# Patient Record
Sex: FEMALE | Race: WHITE | NOT HISPANIC OR LATINO | Employment: PART TIME | URBAN - METROPOLITAN AREA
[De-identification: names, ages, dates, MRNs, and addresses within clinical notes are randomized per-mention and may not be internally consistent; named-entity substitution may affect disease eponyms.]

---
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
## 2025-01-07 DIAGNOSIS — Z34.02 ENCOUNTER FOR SUPERVISION OF NORMAL FIRST PREGNANCY IN SECOND TRIMESTER: Primary | ICD-10-CM

## 2025-01-13 ENCOUNTER — PROCEDURE VISIT (OUTPATIENT)
Dept: OBSTETRICS AND GYNECOLOGY | Facility: CLINIC | Age: 20
End: 2025-01-13
Payer: COMMERCIAL

## 2025-01-13 ENCOUNTER — ROUTINE PRENATAL (OUTPATIENT)
Dept: OBSTETRICS AND GYNECOLOGY | Facility: CLINIC | Age: 20
End: 2025-01-13
Payer: COMMERCIAL

## 2025-01-13 VITALS
HEART RATE: 101 BPM | SYSTOLIC BLOOD PRESSURE: 109 MMHG | DIASTOLIC BLOOD PRESSURE: 68 MMHG | WEIGHT: 172.69 LBS | BODY MASS INDEX: 27.87 KG/M2

## 2025-01-13 DIAGNOSIS — Z34.02 ENCOUNTER FOR SUPERVISION OF NORMAL FIRST PREGNANCY IN SECOND TRIMESTER: Primary | ICD-10-CM

## 2025-01-13 DIAGNOSIS — Z34.02 ENCOUNTER FOR SUPERVISION OF NORMAL FIRST PREGNANCY IN SECOND TRIMESTER: ICD-10-CM

## 2025-01-13 DIAGNOSIS — Z3A.22 22 WEEKS GESTATION OF PREGNANCY: ICD-10-CM

## 2025-01-13 PROCEDURE — 0502F SUBSEQUENT PRENATAL CARE: CPT | Mod: CPTII,,, | Performed by: STUDENT IN AN ORGANIZED HEALTH CARE EDUCATION/TRAINING PROGRAM

## 2025-01-13 NOTE — PROGRESS NOTES
CC: Follow-Up OB    HPI:   19 y.o.  at 22w3d with EDC of 2025, by Ultrasound, here for her follow up OB visit. Complains of nothing today.    Pregnancy ROS:  Positive fetal movement   Negative leakage of fluid   Negative vaginal bleeding   Negative headache, vision changes, RUQ pain, epigastric pain  Negative contractions/abdominal pain   Negative pelvic pressure     Physical Exam:  Prenatal Vitals  BP: 109/68  Weight: 78.3 kg (172 lb 11.2 oz)  Urine Glucose: Negative  Urine Albumin: Negative    Wt Readings from Last 3 Encounters:   25 78.3 kg (172 lb 11.2 oz) (93%, Z= 1.47)*   24 73.9 kg (163 lb) (89%, Z= 1.25)*   24 72.1 kg (159 lb) (87%, Z= 1.15)*     * Growth percentiles are based on [facility] (Girls, 2-20 Years) data.     Body mass index is 27.87 kg/m².    General: NAD, well developed, well nourished  Psych: alert and oriented to person, time and place, normal affect  HEENT: normocephalic, atraumatic  Abd: Gravid, soft, NT, ND  Skin: warm, dry  Neuro: normal gait, gross motor function intact  No cyanosis, clubbing or edema    FHTs: +    Maternal Blood Type: A+/-    ASSESSMENT: 19 y.o.  @ 22w3d with   Patient Active Problem List   Diagnosis    Encounter for supervision of normal pregnancy in first trimester       PLAN:  Anatomy scan  Osull on RTC  Pain, fever, bleeding precautions  MARINA, JUAN, PreE precautions  Cont PNV, Iron  Return to clinic in 3 weeks

## 2025-01-24 ENCOUNTER — PATIENT MESSAGE (OUTPATIENT)
Dept: OTHER | Facility: OTHER | Age: 20
End: 2025-01-24
Payer: COMMERCIAL

## 2025-02-07 ENCOUNTER — PATIENT MESSAGE (OUTPATIENT)
Dept: OTHER | Facility: OTHER | Age: 20
End: 2025-02-07
Payer: COMMERCIAL

## 2025-02-10 ENCOUNTER — ROUTINE PRENATAL (OUTPATIENT)
Dept: OBSTETRICS AND GYNECOLOGY | Facility: CLINIC | Age: 20
End: 2025-02-10
Payer: COMMERCIAL

## 2025-02-10 ENCOUNTER — TELEPHONE (OUTPATIENT)
Dept: OBSTETRICS AND GYNECOLOGY | Facility: CLINIC | Age: 20
End: 2025-02-10

## 2025-02-10 ENCOUNTER — PATIENT MESSAGE (OUTPATIENT)
Dept: OBSTETRICS AND GYNECOLOGY | Facility: CLINIC | Age: 20
End: 2025-02-10

## 2025-02-10 VITALS
BODY MASS INDEX: 28.41 KG/M2 | DIASTOLIC BLOOD PRESSURE: 68 MMHG | WEIGHT: 176 LBS | HEART RATE: 90 BPM | SYSTOLIC BLOOD PRESSURE: 112 MMHG

## 2025-02-10 DIAGNOSIS — Z34.02 ENCOUNTER FOR SUPERVISION OF NORMAL FIRST PREGNANCY IN SECOND TRIMESTER: Primary | ICD-10-CM

## 2025-02-10 DIAGNOSIS — Z3A.26 26 WEEKS GESTATION OF PREGNANCY: ICD-10-CM

## 2025-02-10 PROBLEM — O99.810 ABNORMAL O'SULLIVAN GLUCOSE CHALLENGE TEST, ANTEPARTUM: Status: ACTIVE | Noted: 2025-02-10

## 2025-02-10 LAB — GLUCOSE 1 HR POST 50 GM: 143 MG/DL (ref 70–130)

## 2025-02-10 PROCEDURE — 0502F SUBSEQUENT PRENATAL CARE: CPT | Mod: CPTII,,, | Performed by: STUDENT IN AN ORGANIZED HEALTH CARE EDUCATION/TRAINING PROGRAM

## 2025-02-10 NOTE — PROGRESS NOTES
CC: Follow-Up OB    HPI:   19 y.o.  at 26w3d with EDC of 2025, by Ultrasound, here for her follow up OB visit. Complains of nothing today.    Pregnancy ROS:  Positive fetal movement   Negative leakage of fluid   Negative vaginal bleeding   Negative headache, vision changes, RUQ pain, epigastric pain  Negative contractions/abdominal pain   Negative pelvic pressure     Physical Exam:  Prenatal Vitals  BP: 112/68  Weight: 79.8 kg (176 lb)    Wt Readings from Last 3 Encounters:   02/10/25 79.8 kg (176 lb) (94%, Z= 1.53)*   25 78.3 kg (172 lb 11.2 oz) (93%, Z= 1.47)*   24 73.9 kg (163 lb) (89%, Z= 1.25)*     * Growth percentiles are based on [facility] (Girls, 2-20 Years) data.       Body mass index is 28.41 kg/m².    General: NAD, well developed, well nourished  Psych: alert and oriented to person, time and place, normal affect  HEENT: normocephalic, atraumatic  Abd: Gravid, soft, NT, ND  Skin: warm, dry  Neuro: normal gait, gross motor function intact  No cyanosis, clubbing or edema    FHTs: +    Maternal Blood Type: A+/-    ASSESSMENT: 19 y.o.  @ 26w3d with   Patient Active Problem List   Diagnosis    Encounter for supervision of normal pregnancy in first trimester       PLAN:  Osull today  3rd trimester labs and tdap on RTC  Pain, fever, bleeding precautions  MARINA, JUAN, PreE precautions  Cont PNV, Iron  Return to clinic in 3 weeks

## 2025-02-10 NOTE — TELEPHONE ENCOUNTER
----- Message from Dandy sent at 2/10/2025  2:23 PM CST -----  Contact: self:[phone]  Type:  Patient Returning Call    Who Called:Josefina  Who Left Message for Patient: Manuela  Does the patient know what this is regarding?:yes  Would the patient rather a call back or a response via [facility]? Call back   Best Call Back Number:[phone]   Additional Information: pt can't get into [facility] , would chica a phone call

## 2025-02-21 ENCOUNTER — PATIENT MESSAGE (OUTPATIENT)
Dept: OTHER | Facility: OTHER | Age: 20
End: 2025-02-21
Payer: COMMERCIAL

## 2025-03-03 ENCOUNTER — ROUTINE PRENATAL (OUTPATIENT)
Dept: OBSTETRICS AND GYNECOLOGY | Facility: CLINIC | Age: 20
End: 2025-03-03
Payer: COMMERCIAL

## 2025-03-03 VITALS
SYSTOLIC BLOOD PRESSURE: 110 MMHG | WEIGHT: 179 LBS | HEART RATE: 101 BPM | DIASTOLIC BLOOD PRESSURE: 64 MMHG | BODY MASS INDEX: 28.89 KG/M2

## 2025-03-03 DIAGNOSIS — Z3A.29 29 WEEKS GESTATION OF PREGNANCY: ICD-10-CM

## 2025-03-03 DIAGNOSIS — Z34.03 ENCOUNTER FOR SUPERVISION OF NORMAL FIRST PREGNANCY IN THIRD TRIMESTER: Primary | ICD-10-CM

## 2025-03-03 PROCEDURE — 0502F SUBSEQUENT PRENATAL CARE: CPT | Mod: CPTII,,, | Performed by: STUDENT IN AN ORGANIZED HEALTH CARE EDUCATION/TRAINING PROGRAM

## 2025-03-03 NOTE — PROGRESS NOTES
CC: Follow-Up OB    HPI:   19 y.o.  at 29w3d with EDC of 2025, by Ultrasound, here for her follow up OB visit. Complains of nothing today.    Pregnancy ROS:  Positive fetal movement   Negative leakage of fluid   Negative vaginal bleeding   Negative headache, vision changes, RUQ pain, epigastric pain  Negative contractions/abdominal pain   Negative pelvic pressure     Physical Exam:  Prenatal Vitals  BP: 110/64  Weight: 81.2 kg (179 lb)    Wt Readings from Last 3 Encounters:   25 81.2 kg (179 lb) (94%, Z= 1.59)*   02/10/25 79.8 kg (176 lb) (94%, Z= 1.53)*   25 78.3 kg (172 lb 11.2 oz) (93%, Z= 1.47)*     * Growth percentiles are based on [facility] (Girls, 2-20 Years) data.     Body mass index is 28.89 kg/m².    General: NAD, well developed, well nourished  Psych: alert and oriented to person, time and place, normal affect  HEENT: normocephalic, atraumatic  Abd: Gravid, soft, NT, ND  Skin: warm, dry  Neuro: normal gait, gross motor function intact  No cyanosis, clubbing or edema    FHTs: +    Maternal Blood Type: A+/-    ASSESSMENT: 19 y.o.  @ 29w3d with   Problem List[1]    PLAN:  Osull abn, 3h GTT wnl  3rd trimester labs and tdap today  Pain, fever, bleeding precautions  MARINA, JUAN, PreE precautions  Cont PNV, Iron  Return to clinic in 3 weeks           [1]   Patient Active Problem List  Diagnosis    Encounter for supervision of normal pregnancy in first trimester    Abnormal O'Gonzalez glucose challenge test, antepartum

## 2025-03-07 ENCOUNTER — PATIENT MESSAGE (OUTPATIENT)
Dept: OTHER | Facility: OTHER | Age: 20
End: 2025-03-07
Payer: COMMERCIAL

## 2025-03-17 ENCOUNTER — ROUTINE PRENATAL (OUTPATIENT)
Dept: OBSTETRICS AND GYNECOLOGY | Facility: CLINIC | Age: 20
End: 2025-03-17
Payer: COMMERCIAL

## 2025-03-17 VITALS — BODY MASS INDEX: 28.73 KG/M2 | WEIGHT: 178 LBS | DIASTOLIC BLOOD PRESSURE: 68 MMHG | SYSTOLIC BLOOD PRESSURE: 108 MMHG

## 2025-03-17 DIAGNOSIS — Z3A.31 31 WEEKS GESTATION OF PREGNANCY: ICD-10-CM

## 2025-03-17 DIAGNOSIS — Z34.03 ENCOUNTER FOR SUPERVISION OF NORMAL FIRST PREGNANCY IN THIRD TRIMESTER: Primary | ICD-10-CM

## 2025-03-17 LAB
ABS NRBC COUNT: 0 X 10 3/UL (ref 0–0.01)
ABSOLUTE BASOPHIL: 0.02 X 10 3/UL (ref 0–0.22)
ABSOLUTE EOSINOPHIL: 0.11 X 10 3/UL (ref 0.04–0.54)
ABSOLUTE IMMATURE GRAN: 0.03 X 10 3/UL (ref 0–0.04)
ABSOLUTE LYMPHOCYTE: 1.79 X 10 3/UL (ref 0.86–4.75)
ABSOLUTE MONOCYTE: 0.59 X 10 3/UL (ref 0.22–1.08)
BASOPHILS NFR BLD: 0.2 % (ref 0.2–1.2)
EOSINOPHIL NFR BLD: 1.2 % (ref 0.7–7)
HCT VFR BLD AUTO: 30.9 % (ref 37–47)
HGB BLD-MCNC: 10.2 G/DL (ref 12–16)
HIV 1+2 AB+HIV1 P24 AG SERPL QL IA: NONREACTIVE
IMMATURE GRANULOCYTES: 0.3 % (ref 0–0.5)
LYMPHOCYTES NFR BLD: 18.9 % (ref 19.3–53.1)
MCH RBC QN AUTO: 29 PG (ref 27–32)
MCHC RBC AUTO-ENTMCNC: 33 G/DL (ref 32–36)
MCV RBC AUTO: 87.8 FL (ref 82–100)
MONOCYTES NFR BLD: 6.2 % (ref 4.7–12.5)
NEUTROPHILS # BLD AUTO: 6.93 X 10 3/UL (ref 2.15–7.56)
NEUTROPHILS NFR BLD: 73.2 % (ref 34–71.1)
NUCLEATED RED BLOOD CELLS: 0 /100 WBC (ref 0–0.2)
PLATELET # BLD AUTO: 235 X 10 3/UL (ref 135–400)
RBC # BLD AUTO: 3.52 X 10 6/UL (ref 4.2–5.4)
RDW-SD: 36.7 FL (ref 37–54)
SYPHILIS TREPONEMAL ANTIBODY: NONREACTIVE
WBC # BLD: 9.47 X 10 3/UL (ref 4.3–10.8)

## 2025-03-17 PROCEDURE — 0502F SUBSEQUENT PRENATAL CARE: CPT | Mod: CPTII,,, | Performed by: STUDENT IN AN ORGANIZED HEALTH CARE EDUCATION/TRAINING PROGRAM

## 2025-03-17 NOTE — PROGRESS NOTES
CC: Follow-Up OB    HPI:   19 y.o.  at 31w3d with EDC of 2025, by Ultrasound, here for her follow up OB visit. Complains of nothing today.    Pregnancy ROS:  Positive fetal movement   Negative leakage of fluid   Negative vaginal bleeding   Negative headache, vision changes, RUQ pain, epigastric pain  Negative contractions/abdominal pain   Negative pelvic pressure     Physical Exam:  Prenatal Vitals  BP: 108/68  Weight: 80.7 kg (178 lb)    Wt Readings from Last 3 Encounters:   25 80.7 kg (178 lb) (94%, Z= 1.57)*   25 81.2 kg (179 lb) (94%, Z= 1.59)*   02/10/25 79.8 kg (176 lb) (94%, Z= 1.53)*     * Growth percentiles are based on CDC (Girls, 2-20 Years) data.     Body mass index is 28.73 kg/m².    General: NAD, well developed, well nourished  Psych: alert and oriented to person, time and place, normal affect  HEENT: normocephalic, atraumatic  Abd: Gravid, soft, NT, ND  Skin: warm, dry  Neuro: normal gait, gross motor function intact  No cyanosis, clubbing or edema    FHTs: +    Maternal Blood Type: A+/-    ASSESSMENT: 19 y.o.  @ 31w3d with   Problem List[1]    PLAN:  3rd trimester labs today   Pain, fever, bleeding precautions  MARINA, JUAN, PreE precautions  Cont PNV, Iron  Return to clinic in 2 weeks           [1]   Patient Active Problem List  Diagnosis    Encounter for supervision of normal pregnancy in first trimester    Abnormal O'Gonzalez glucose challenge test, antepartum

## 2025-03-21 ENCOUNTER — PATIENT MESSAGE (OUTPATIENT)
Dept: OTHER | Facility: OTHER | Age: 20
End: 2025-03-21
Payer: COMMERCIAL

## 2025-03-31 ENCOUNTER — ROUTINE PRENATAL (OUTPATIENT)
Dept: OBSTETRICS AND GYNECOLOGY | Facility: CLINIC | Age: 20
End: 2025-03-31
Payer: COMMERCIAL

## 2025-03-31 VITALS
SYSTOLIC BLOOD PRESSURE: 102 MMHG | BODY MASS INDEX: 29.57 KG/M2 | HEART RATE: 112 BPM | WEIGHT: 183.19 LBS | DIASTOLIC BLOOD PRESSURE: 68 MMHG

## 2025-03-31 DIAGNOSIS — Z3A.33 33 WEEKS GESTATION OF PREGNANCY: ICD-10-CM

## 2025-03-31 DIAGNOSIS — Z34.03 ENCOUNTER FOR SUPERVISION OF NORMAL FIRST PREGNANCY IN THIRD TRIMESTER: Primary | ICD-10-CM

## 2025-03-31 PROCEDURE — 0502F SUBSEQUENT PRENATAL CARE: CPT | Mod: CPTII,,, | Performed by: STUDENT IN AN ORGANIZED HEALTH CARE EDUCATION/TRAINING PROGRAM

## 2025-03-31 NOTE — PROGRESS NOTES
CC: Follow-Up OB    HPI:   19 y.o.  at 33w3d with EDC of 2025, by Ultrasound, here for her follow up OB visit. Complains of nothing today.    Pregnancy ROS:  Positive fetal movement   Negative leakage of fluid   Negative vaginal bleeding   Negative headache, vision changes, RUQ pain, epigastric pain  Negative contractions/abdominal pain   Negative pelvic pressure     Physical Exam:  Prenatal Vitals  BP: 102/68  Weight: 83.1 kg (183 lb 3.2 oz)    Wt Readings from Last 3 Encounters:   25 83.1 kg (183 lb 3.2 oz) (95%, Z= 1.66)*   25 80.7 kg (178 lb) (94%, Z= 1.57)*   25 81.2 kg (179 lb) (94%, Z= 1.59)*     * Growth percentiles are based on [facility] (Girls, 2-20 Years) data.       Body mass index is 29.57 kg/m².    General: NAD, well developed, well nourished  Psych: alert and oriented to person, time and place, normal affect  HEENT: normocephalic, atraumatic  Abd: Gravid, soft, NT, ND  Skin: warm, dry  Neuro: normal gait, gross motor function intact  No cyanosis, clubbing or edema    FHTs: +    Maternal Blood Type: A+/-    ASSESSMENT: 19 y.o.  @ 33w3d with   Problem List[1]    PLAN:  3rd trimester labs reviewed  GBS and discuss IOL on RTC   Pain, fever, bleeding precautions  MARINA, JUAN, PreE precautions  Cont PNV, Iron  Return to clinic in 2 weeks           [1]   Patient Active Problem List  Diagnosis    Encounter for supervision of normal pregnancy in first trimester    Abnormal O'Gonzalez glucose challenge test, antepartum

## 2025-04-08 ENCOUNTER — TELEPHONE (OUTPATIENT)
Dept: OBSTETRICS AND GYNECOLOGY | Facility: CLINIC | Age: 20
End: 2025-04-08
Payer: COMMERCIAL

## 2025-04-08 NOTE — TELEPHONE ENCOUNTER
Spoke to pt she stated she has been vomiting and diarrhea and also hasn't felt baby move much. Told her to go to L&D.

## 2025-04-08 NOTE — TELEPHONE ENCOUNTER
----- Message from Maricel sent at 4/8/2025  1:13 PM CDT -----  Contact: can  .Type:  Needs Medical AdviceWheunice Called:  can Symptoms (please be specific): Vomiting, diarrhea   since 5am this morning. Asking if she should go to urgent care or schedule with Dr gregory How long has patient had these symptoms:   1 day? Pharmacy name and phone #:   .[facility]/pharmacy #[id number] - [place], [place] - [id number] [place] [address]Fndld: [phone] Fax: [phone] the patient rather a call back or a response via [facility]?  Call Best Call Back Number:  [phone] Additional Information:  MRN: [id number] / Dr gregory- if appt is needed, latest appt possible please.  ----- Message -----  From: Maricel Alcocer  Sent: 4/8/2025   1:15 PM CDT  To: Opal Nunez    .Type:  Needs Medical AdviceWheunice Called:  can Symptoms (please be specific): Vomiting, diarrhea   since 5am this morning. Asking if she should go to urgent care or schedule with Dr gregory How long has patient had these symptoms:   1 day? Pharmacy name and phone #:   .[facility]/pharmacy #[id number] - [place], [place] - [id number] [place] [place] [address]Jigat: [phone] Fax: [phone] the patient rather a call back or a response via [facility]?  Call [facility] Call Back Number:  [phone] Additional Information:  MRN: [id number] / Dr gregory

## 2025-04-11 ENCOUNTER — PATIENT MESSAGE (OUTPATIENT)
Dept: OTHER | Facility: OTHER | Age: 20
End: 2025-04-11
Payer: COMMERCIAL

## 2025-04-14 ENCOUNTER — ROUTINE PRENATAL (OUTPATIENT)
Dept: OBSTETRICS AND GYNECOLOGY | Facility: CLINIC | Age: 20
End: 2025-04-14
Payer: COMMERCIAL

## 2025-04-14 VITALS
SYSTOLIC BLOOD PRESSURE: 115 MMHG | BODY MASS INDEX: 29.5 KG/M2 | WEIGHT: 182.81 LBS | DIASTOLIC BLOOD PRESSURE: 76 MMHG | HEART RATE: 99 BPM

## 2025-04-14 DIAGNOSIS — Z3A.35 35 WEEKS GESTATION OF PREGNANCY: ICD-10-CM

## 2025-04-14 DIAGNOSIS — Z34.03 ENCOUNTER FOR SUPERVISION OF NORMAL FIRST PREGNANCY IN THIRD TRIMESTER: Primary | ICD-10-CM

## 2025-04-14 PROCEDURE — 0502F SUBSEQUENT PRENATAL CARE: CPT | Mod: CPTII,,, | Performed by: STUDENT IN AN ORGANIZED HEALTH CARE EDUCATION/TRAINING PROGRAM

## 2025-04-14 NOTE — PROGRESS NOTES
CC: Follow-Up OB    HPI:   19 y.o.  at 35w3d with EDC of 2025, by Ultrasound, here for her follow up OB visit. Complains of occasional ctx.    Pregnancy ROS:  Positive fetal movement   Negative leakage of fluid   Negative vaginal bleeding   Negative headache, vision changes, RUQ pain, epigastric pain  Negative contractions/abdominal pain   Negative pelvic pressure     Physical Exam:  Prenatal Vitals  BP: 115/76  Weight: 82.9 kg (182 lb 12.8 oz)  Urine Glucose: Negative  Urine Albumin: Negative    Wt Readings from Last 3 Encounters:   25 82.9 kg (182 lb 12.8 oz) (95%, Z= 1.66)*   25 83.1 kg (183 lb 3.2 oz) (95%, Z= 1.66)*   25 80.7 kg (178 lb) (94%, Z= 1.57)*     * Growth percentiles are based on CDC (Girls, 2-20 Years) data.     Body mass index is 29.5 kg/m².    General: NAD, well developed, well nourished  Psych: alert and oriented to person, time and place, normal affect  HEENT: normocephalic, atraumatic  Abd: Gravid, soft, NT, ND  Skin: warm, dry  Neuro: normal gait, gross motor function intact  Pelvic: NEFG. Cvx -/hi  No cyanosis, clubbing or edema    FHTs: +    Maternal Blood Type: A+/-    ASSESSMENT: 19 y.o.  @ 35w3d with   Problem List[1]    PLAN:  GBS and discuss IOL today  Pt to think about IOL  Pain, fever, bleeding precautions  MARINA, JUAN, PreE precautions  Cont PNV, Iron  Return to clinic in 1 weeks           [1]   Patient Active Problem List  Diagnosis    Encounter for supervision of normal pregnancy in first trimester    Abnormal O'Gonzalez glucose challenge test, antepartum

## 2025-04-15 LAB
GROUP B STREP MOLECULAR: NEGATIVE
PENICILLIN ALLERGIC: NO

## 2025-04-21 ENCOUNTER — ROUTINE PRENATAL (OUTPATIENT)
Dept: OBSTETRICS AND GYNECOLOGY | Facility: CLINIC | Age: 20
End: 2025-04-21
Payer: COMMERCIAL

## 2025-04-21 VITALS
DIASTOLIC BLOOD PRESSURE: 82 MMHG | SYSTOLIC BLOOD PRESSURE: 125 MMHG | HEART RATE: 99 BPM | WEIGHT: 180.63 LBS | BODY MASS INDEX: 29.15 KG/M2

## 2025-04-21 DIAGNOSIS — Z34.03 ENCOUNTER FOR SUPERVISION OF NORMAL FIRST PREGNANCY IN THIRD TRIMESTER: Primary | ICD-10-CM

## 2025-04-21 DIAGNOSIS — Z3A.36 36 WEEKS GESTATION OF PREGNANCY: ICD-10-CM

## 2025-04-21 PROCEDURE — 0502F SUBSEQUENT PRENATAL CARE: CPT | Mod: CPTII,,, | Performed by: STUDENT IN AN ORGANIZED HEALTH CARE EDUCATION/TRAINING PROGRAM

## 2025-04-21 NOTE — PROGRESS NOTES
CC: Follow-Up OB    HPI:   19 y.o.  at 36w3d with EDC of 2025, by Ultrasound, here for her follow up OB visit. Complains of nothing today.    Pregnancy ROS:  Positive fetal movement   Negative leakage of fluid   Negative vaginal bleeding   Negative headache, vision changes, RUQ pain, epigastric pain  Negative contractions/abdominal pain   Negative pelvic pressure     Physical Exam:  Prenatal Vitals  BP: 125/82  Weight: 81.9 kg (180 lb 9.6 oz)  Urine Glucose: Negative  Urine Albumin: Negative    Wt Readings from Last 3 Encounters:   25 81.9 kg (180 lb 9.6 oz) (95%, Z= 1.61)*   25 82.9 kg (182 lb 12.8 oz) (95%, Z= 1.66)*   25 83.1 kg (183 lb 3.2 oz) (95%, Z= 1.66)*     * Growth percentiles are based on [facility] (Girls, 2-20 Years) data.       Body mass index is 29.15 kg/m².    General: NAD, well developed, well nourished  Psych: alert and oriented to person, time and place, normal affect  HEENT: normocephalic, atraumatic  Abd: Gravid, soft, NT, ND  Skin: warm, dry  Neuro: normal gait, gross motor function intact  Pelvic: NEFG. Cvx -/hi  No cyanosis, clubbing or edema    FHTs: +    Maternal Blood Type: A+/-    ASSESSMENT: 19 y.o.  @ 36w3d with   Problem List[1]    PLAN:  GBS negative  Discussed IOL, pt agreeable  IOL scheduled for 5/15 with cytotec  Pain, fever, bleeding precautions  MARINA, JUAN, PreE precautions  Cont PNV, Iron  Return to clinic in 1 weeks           [1]   Patient Active Problem List  Diagnosis    Encounter for supervision of normal pregnancy in first trimester    Abnormal O'Gonzalez glucose challenge test, antepartum

## 2025-04-28 ENCOUNTER — ROUTINE PRENATAL (OUTPATIENT)
Dept: OBSTETRICS AND GYNECOLOGY | Facility: CLINIC | Age: 20
End: 2025-04-28
Payer: COMMERCIAL

## 2025-04-28 VITALS — WEIGHT: 182 LBS | BODY MASS INDEX: 29.38 KG/M2 | SYSTOLIC BLOOD PRESSURE: 118 MMHG | DIASTOLIC BLOOD PRESSURE: 78 MMHG

## 2025-04-28 DIAGNOSIS — Z3A.37 37 WEEKS GESTATION OF PREGNANCY: ICD-10-CM

## 2025-04-28 DIAGNOSIS — Z34.03 ENCOUNTER FOR SUPERVISION OF NORMAL FIRST PREGNANCY IN THIRD TRIMESTER: Primary | ICD-10-CM

## 2025-04-28 PROCEDURE — 0502F SUBSEQUENT PRENATAL CARE: CPT | Mod: CPTII,,, | Performed by: STUDENT IN AN ORGANIZED HEALTH CARE EDUCATION/TRAINING PROGRAM

## 2025-04-28 NOTE — PROGRESS NOTES
CC: Follow-Up OB    HPI:   19 y.o.  at 37w3d with EDC of 2025, by Ultrasound, here for her follow up OB visit. Complains of occasional ctx.    Pregnancy ROS:  Positive fetal movement   Negative leakage of fluid   Negative vaginal bleeding   Negative headache, vision changes, RUQ pain, epigastric pain  Negative contractions/abdominal pain   Negative pelvic pressure     Physical Exam:  Prenatal Vitals  BP: 118/78  Weight: 82.6 kg (182 lb)    Wt Readings from Last 3 Encounters:   25 82.6 kg (182 lb) (95%, Z= 1.64)*   25 81.9 kg (180 lb 9.6 oz) (95%, Z= 1.61)*   25 82.9 kg (182 lb 12.8 oz) (95%, Z= 1.66)*     * Growth percentiles are based on [facility] (Girls, 2-20 Years) data.       Body mass index is 29.38 kg/m².    General: NAD, well developed, well nourished  Psych: alert and oriented to person, time and place, normal affect  HEENT: normocephalic, atraumatic  Abd: Gravid, soft, NT, ND  Skin: warm, dry  Neuro: normal gait, gross motor function intact  Pelvic: NEFG. Cvx 2/50/hi  No cyanosis, clubbing or edema    FHTs: +    Maternal Blood Type: A+/-    ASSESSMENT: 19 y.o.  @ 37w3d with   Problem List[1]    PLAN:  GBS negative  IOL scheduled for 5/15 with cytotec  Pain, fever, bleeding precautions  MARINA, JUAN, PreE precautions  Cont PNV, Iron  Return to clinic in 1 weeks           [1]   Patient Active Problem List  Diagnosis    Encounter for supervision of normal pregnancy in first trimester    Abnormal O'Gonzalez glucose challenge test, antepartum

## 2025-05-05 ENCOUNTER — ROUTINE PRENATAL (OUTPATIENT)
Dept: OBSTETRICS AND GYNECOLOGY | Facility: CLINIC | Age: 20
End: 2025-05-05
Payer: COMMERCIAL

## 2025-05-05 VITALS
SYSTOLIC BLOOD PRESSURE: 107 MMHG | BODY MASS INDEX: 29.57 KG/M2 | HEART RATE: 105 BPM | DIASTOLIC BLOOD PRESSURE: 72 MMHG | WEIGHT: 183.19 LBS

## 2025-05-05 DIAGNOSIS — Z3A.38 38 WEEKS GESTATION OF PREGNANCY: ICD-10-CM

## 2025-05-05 DIAGNOSIS — Z34.03 ENCOUNTER FOR SUPERVISION OF NORMAL FIRST PREGNANCY IN THIRD TRIMESTER: Primary | ICD-10-CM

## 2025-05-05 PROCEDURE — 0502F SUBSEQUENT PRENATAL CARE: CPT | Mod: CPTII,,, | Performed by: STUDENT IN AN ORGANIZED HEALTH CARE EDUCATION/TRAINING PROGRAM

## 2025-05-05 NOTE — PROGRESS NOTES
CC: Follow-Up OB    HPI:   19 y.o.  at 38w5d with EDC of 2025, by Ultrasound, here for her follow up OB visit. Complains of nothing today.    Pregnancy ROS:  Positive fetal movement   Negative leakage of fluid   Negative vaginal bleeding   Negative headache, vision changes, RUQ pain, epigastric pain  Negative contractions/abdominal pain   Negative pelvic pressure     Physical Exam:  Prenatal Vitals  BP: 107/72  Weight: 83.1 kg (183 lb 3.2 oz)  Urine Glucose: Negative  Urine Albumin: Negative    Wt Readings from Last 3 Encounters:   25 83.1 kg (183 lb 3.2 oz) (95%, Z= 1.66)*   25 82.6 kg (182 lb) (95%, Z= 1.64)*   25 81.9 kg (180 lb 9.6 oz) (95%, Z= 1.61)*     * Growth percentiles are based on CDC (Girls, 2-20 Years) data.     Body mass index is 29.57 kg/m².    General: NAD, well developed, well nourished  Psych: alert and oriented to person, time and place, normal affect  HEENT: normocephalic, atraumatic  Abd: Gravid, soft, NT, ND  Skin: warm, dry  Neuro: normal gait, gross motor function intact  Pelvic: NEFG. Cvx 2-3/25/hi  No cyanosis, clubbing or edema    FHTs: +    Maternal Blood Type: A+/-    ASSESSMENT: 19 y.o.  @ 38w5d with   Problem List[1]    PLAN:  GBS negative  IOL scheduled for  with cytotec  Pain, fever, bleeding precautions  MARINA, JUAN, PreE precautions  Cont PNV, Iron  Return to clinic in 1 weeks           [1]   Patient Active Problem List  Diagnosis    Encounter for supervision of normal pregnancy in first trimester    Abnormal O'Gonzalez glucose challenge test, antepartum

## 2025-05-08 ENCOUNTER — OUTSIDE PLACE OF SERVICE (OUTPATIENT)
Dept: OBSTETRICS AND GYNECOLOGY | Facility: CLINIC | Age: 20
End: 2025-05-08
Payer: COMMERCIAL

## 2025-05-09 ENCOUNTER — TELEPHONE (OUTPATIENT)
Dept: OBSTETRICS AND GYNECOLOGY | Facility: CLINIC | Age: 20
End: 2025-05-09
Payer: COMMERCIAL

## 2025-05-09 NOTE — TELEPHONE ENCOUNTER
Called pt let her know we do not call out pain medications if she is in severe pain to report to the ER

## 2025-05-09 NOTE — TELEPHONE ENCOUNTER
----- Message from Mouth Party sent at 5/9/2025  3:47 PM CDT -----  Contact: Mother/ Markus  .Type:  Needs Medical AdviceWho Called:  Pts mother / markus Symptoms (please be specific):  Patients mother states pt only has 800mg ibuprofen of . Still in pain, asking if she can be prescribed something stronger. Delivered 5/8  How long has patient had these symptoms:  1 day > Pharmacy name and phone #:   .[facility]/pharmacy #[id number] - [place], [place] - [id number] [place] [address]Gyegk: [phone] Fax: [phone] the patient rather a call back or a response via [facility]?  [facility] Call Back Number:  [phone] Additional Information:

## 2025-05-30 ENCOUNTER — PATIENT MESSAGE (OUTPATIENT)
Dept: OBSTETRICS AND GYNECOLOGY | Facility: CLINIC | Age: 20
End: 2025-05-30
Payer: COMMERCIAL

## 2025-06-30 ENCOUNTER — OFFICE VISIT (OUTPATIENT)
Dept: OBSTETRICS AND GYNECOLOGY | Facility: CLINIC | Age: 20
End: 2025-06-30
Payer: COMMERCIAL

## 2025-06-30 VITALS
WEIGHT: 162 LBS | HEART RATE: 87 BPM | SYSTOLIC BLOOD PRESSURE: 124 MMHG | BODY MASS INDEX: 26.15 KG/M2 | DIASTOLIC BLOOD PRESSURE: 77 MMHG

## 2025-06-30 DIAGNOSIS — Z30.013 ENCOUNTER FOR INITIAL PRESCRIPTION OF INJECTABLE CONTRACEPTIVE: ICD-10-CM

## 2025-06-30 PROCEDURE — 3074F SYST BP LT 130 MM HG: CPT | Mod: CPTII,,, | Performed by: STUDENT IN AN ORGANIZED HEALTH CARE EDUCATION/TRAINING PROGRAM

## 2025-06-30 PROCEDURE — 0503F POSTPARTUM CARE VISIT: CPT | Mod: CPTII,,, | Performed by: STUDENT IN AN ORGANIZED HEALTH CARE EDUCATION/TRAINING PROGRAM

## 2025-06-30 PROCEDURE — 1159F MED LIST DOCD IN RCRD: CPT | Mod: CPTII,,, | Performed by: STUDENT IN AN ORGANIZED HEALTH CARE EDUCATION/TRAINING PROGRAM

## 2025-06-30 PROCEDURE — 1160F RVW MEDS BY RX/DR IN RCRD: CPT | Mod: CPTII,,, | Performed by: STUDENT IN AN ORGANIZED HEALTH CARE EDUCATION/TRAINING PROGRAM

## 2025-06-30 PROCEDURE — 3078F DIAST BP <80 MM HG: CPT | Mod: CPTII,,, | Performed by: STUDENT IN AN ORGANIZED HEALTH CARE EDUCATION/TRAINING PROGRAM

## 2025-06-30 RX ORDER — MEDROXYPROGESTERONE ACETATE 150 MG/ML
150 INJECTION, SUSPENSION INTRAMUSCULAR
Status: COMPLETED | OUTPATIENT
Start: 2025-06-30 | End: 2025-06-30

## 2025-06-30 RX ADMIN — MEDROXYPROGESTERONE ACETATE 150 MG: 150 INJECTION, SUSPENSION INTRAMUSCULAR at 11:06

## 2025-06-30 NOTE — PROGRESS NOTES
Patient is a 19-year-old white female status post vaginal delivery presents to clinic for 6 week postpartum visit.  She is doing well today.  She is formula feeding without difficulty.  She reports having a menstrual cycle following her delivery.  She desires the Depo-Provera injection for contraception.  She has no other complaints today.  She denies VB/VD/dysuria/Denies any HA, vision changes, F/C, LE swelling. Denies any breast pain/soreness. Denies postpartum depression.     Vitals:    06/30/25 1136   BP: 124/77   Pulse: 87   Weight: 73.5 kg (162 lb)     Body mass index is 26.15 kg/m².  Gen:WDWN in NAD  NC/AT  Non labored breathing   Abd soft, NT/ND  Pelvic NFEG no lesions no discharge  Vaginal walls pink moist well rugated no lesions  Skin: warm and dry  Ext no edema    Problem List[1]    Plan   Patient doing well postpartum   Patient released from postpartum restrictions  We reviewed contraception, she desires the Depo-Provera injection, we reviewed the side effects, she voices understanding and desires to continue  UPT negative   Depo-Provera injection given today   Return to clinic in 3 months for follow-up or sooner if needed             [1]   Patient Active Problem List  Diagnosis    Encounter for supervision of normal pregnancy in first trimester    Abnormal O'Gonzalez glucose challenge test, antepartum